# Patient Record
Sex: MALE | Race: WHITE | NOT HISPANIC OR LATINO
[De-identification: names, ages, dates, MRNs, and addresses within clinical notes are randomized per-mention and may not be internally consistent; named-entity substitution may affect disease eponyms.]

---

## 2017-02-23 DIAGNOSIS — R06.00 DYSPNEA, UNSPECIFIED: ICD-10-CM

## 2017-02-23 DIAGNOSIS — R94.31 ABNORMAL ELECTROCARDIOGRAM [ECG] [EKG]: ICD-10-CM

## 2017-02-24 ENCOUNTER — MEDICATION RENEWAL (OUTPATIENT)
Age: 57
End: 2017-02-24

## 2017-03-16 ENCOUNTER — APPOINTMENT (OUTPATIENT)
Dept: FAMILY MEDICINE | Facility: CLINIC | Age: 57
End: 2017-03-16

## 2017-03-16 VITALS
WEIGHT: 190 LBS | SYSTOLIC BLOOD PRESSURE: 136 MMHG | DIASTOLIC BLOOD PRESSURE: 66 MMHG | HEIGHT: 71 IN | BODY MASS INDEX: 26.6 KG/M2

## 2017-03-17 LAB
ALBUMIN SERPL ELPH-MCNC: 4.1 G/DL
ALP BLD-CCNC: 51 U/L
ALT SERPL-CCNC: 28 U/L
ANION GAP SERPL CALC-SCNC: 16 MMOL/L
AST SERPL-CCNC: 29 U/L
BILIRUB SERPL-MCNC: 0.4 MG/DL
BUN SERPL-MCNC: 21 MG/DL
CALCIUM SERPL-MCNC: 10.2 MG/DL
CHLORIDE SERPL-SCNC: 100 MMOL/L
CHOLEST SERPL-MCNC: 184 MG/DL
CHOLEST/HDLC SERPL: 4.1 RATIO
CO2 SERPL-SCNC: 27 MMOL/L
CREAT SERPL-MCNC: 1.22 MG/DL
GLUCOSE SERPL-MCNC: 92 MG/DL
HDLC SERPL-MCNC: 45 MG/DL
LDLC SERPL CALC-MCNC: 93 MG/DL
POTASSIUM SERPL-SCNC: 4.4 MMOL/L
PROT SERPL-MCNC: 7 G/DL
PSA FREE FLD-MCNC: 32.6 %
PSA FREE SERPL-MCNC: 0.42 NG/ML
PSA SERPL-MCNC: 1.29 NG/ML
SODIUM SERPL-SCNC: 143 MMOL/L
TRIGL SERPL-MCNC: 231 MG/DL

## 2017-07-13 RX ORDER — NEBIVOLOL HYDROCHLORIDE 5 MG/1
5 TABLET ORAL DAILY
Refills: 0 | Status: DISCONTINUED | COMMUNITY
End: 2017-07-13

## 2017-09-25 ENCOUNTER — RX RENEWAL (OUTPATIENT)
Age: 57
End: 2017-09-25

## 2017-10-13 ENCOUNTER — APPOINTMENT (OUTPATIENT)
Dept: FAMILY MEDICINE | Facility: CLINIC | Age: 57
End: 2017-10-13
Payer: COMMERCIAL

## 2017-10-13 VITALS — SYSTOLIC BLOOD PRESSURE: 130 MMHG | DIASTOLIC BLOOD PRESSURE: 74 MMHG

## 2017-10-13 VITALS
WEIGHT: 189 LBS | SYSTOLIC BLOOD PRESSURE: 148 MMHG | HEIGHT: 71 IN | BODY MASS INDEX: 26.46 KG/M2 | DIASTOLIC BLOOD PRESSURE: 70 MMHG

## 2017-10-13 DIAGNOSIS — Z82.49 FAMILY HISTORY OF ISCHEMIC HEART DISEASE AND OTHER DISEASES OF THE CIRCULATORY SYSTEM: ICD-10-CM

## 2017-10-13 DIAGNOSIS — N52.9 MALE ERECTILE DYSFUNCTION, UNSPECIFIED: ICD-10-CM

## 2017-10-13 PROCEDURE — 99214 OFFICE O/P EST MOD 30 MIN: CPT | Mod: 25

## 2017-10-13 PROCEDURE — 36415 COLL VENOUS BLD VENIPUNCTURE: CPT

## 2017-11-03 LAB
ALBUMIN SERPL ELPH-MCNC: 4.5 G/DL
ALP BLD-CCNC: 48 U/L
ALT SERPL-CCNC: 22 U/L
ANION GAP SERPL CALC-SCNC: 14 MMOL/L
AST SERPL-CCNC: 26 U/L
BILIRUB SERPL-MCNC: 0.4 MG/DL
BUN SERPL-MCNC: 22 MG/DL
CALCIUM SERPL-MCNC: 10.4 MG/DL
CHLORIDE SERPL-SCNC: 103 MMOL/L
CO2 SERPL-SCNC: 25 MMOL/L
CREAT SERPL-MCNC: 1.27 MG/DL
GLUCOSE SERPL-MCNC: 98 MG/DL
HEMOCCULT STL QL IA: NEGATIVE
POTASSIUM SERPL-SCNC: 3.6 MMOL/L
PROT SERPL-MCNC: 7.3 G/DL
SODIUM SERPL-SCNC: 142 MMOL/L

## 2018-01-02 ENCOUNTER — MEDICATION RENEWAL (OUTPATIENT)
Age: 58
End: 2018-01-02

## 2018-03-27 ENCOUNTER — RX RENEWAL (OUTPATIENT)
Age: 58
End: 2018-03-27

## 2018-06-21 ENCOUNTER — APPOINTMENT (OUTPATIENT)
Dept: FAMILY MEDICINE | Facility: CLINIC | Age: 58
End: 2018-06-21
Payer: COMMERCIAL

## 2018-06-21 VITALS
SYSTOLIC BLOOD PRESSURE: 120 MMHG | BODY MASS INDEX: 27.58 KG/M2 | HEART RATE: 76 BPM | WEIGHT: 197 LBS | OXYGEN SATURATION: 97 % | RESPIRATION RATE: 12 BRPM | HEIGHT: 71 IN | DIASTOLIC BLOOD PRESSURE: 60 MMHG

## 2018-06-21 DIAGNOSIS — L82.1 OTHER SEBORRHEIC KERATOSIS: ICD-10-CM

## 2018-06-21 DIAGNOSIS — D18.01 HEMANGIOMA OF SKIN AND SUBCUTANEOUS TISSUE: ICD-10-CM

## 2018-06-21 DIAGNOSIS — G62.9 POLYNEUROPATHY, UNSPECIFIED: ICD-10-CM

## 2018-06-21 PROCEDURE — 99214 OFFICE O/P EST MOD 30 MIN: CPT | Mod: 25

## 2018-06-21 PROCEDURE — 36415 COLL VENOUS BLD VENIPUNCTURE: CPT

## 2018-06-21 RX ORDER — AMLODIPINE BESYLATE 5 MG/1
5 TABLET ORAL
Refills: 0 | Status: DISCONTINUED | COMMUNITY
Start: 2017-07-13 | End: 2018-06-21

## 2018-06-21 RX ORDER — HYDROCHLOROTHIAZIDE 25 MG/1
25 TABLET ORAL DAILY
Refills: 0 | Status: DISCONTINUED | COMMUNITY
End: 2018-06-21

## 2018-06-21 NOTE — REVIEW OF SYSTEMS
[Earache] : no earache [Chest Pain] : no chest pain [Shortness Of Breath] : no shortness of breath [Abdominal Pain] : no abdominal pain [Dysuria] : no dysuria [Hematuria] : no hematuria [FreeTextEntry9] : see hpi [de-identified] : pt notes brown papile l side, andcoup[le of red spots on abd

## 2018-06-21 NOTE — HEALTH RISK ASSESSMENT
[No falls in past year] : Patient reported no falls in the past year [0] : 2) Feeling down, depressed, or hopeless: Not at all (0) [] : No [[id number]] : 0

## 2018-06-21 NOTE — HISTORY OF PRESENT ILLNESS
[FreeTextEntry1] : Pt is here for follow up BP check. Pt would like to discuss meds. Pt also c/o bi-lat ankle swelling X approx 2 weeks , pt says is much worse at night . Pt denies any sxs such as SOB, chest pain or dizziness.  [de-identified] : pt notes no cp, no sob, pt notes bl edema, notes  no fever , no headache ,pt needs to lose weight as weight has increased , pt had labs in fall last time here, needs some repeated and needs meds refilled, pt has appt upcoming w cardio

## 2018-06-22 LAB
25(OH)D3 SERPL-MCNC: 38.4 NG/ML
ALBUMIN SERPL ELPH-MCNC: 4.2 G/DL
ALP BLD-CCNC: 49 U/L
ALT SERPL-CCNC: 26 U/L
ANION GAP SERPL CALC-SCNC: 16 MMOL/L
AST SERPL-CCNC: 30 U/L
BASOPHILS # BLD AUTO: 0.02 K/UL
BASOPHILS NFR BLD AUTO: 0.2 %
BILIRUB SERPL-MCNC: 0.4 MG/DL
BUN SERPL-MCNC: 20 MG/DL
CALCIUM SERPL-MCNC: 9.8 MG/DL
CHLORIDE SERPL-SCNC: 102 MMOL/L
CHOLEST SERPL-MCNC: 176 MG/DL
CHOLEST/HDLC SERPL: 3.3 RATIO
CO2 SERPL-SCNC: 25 MMOL/L
CREAT SERPL-MCNC: 1.24 MG/DL
EOSINOPHIL # BLD AUTO: 0.21 K/UL
EOSINOPHIL NFR BLD AUTO: 2.6 %
GLUCOSE SERPL-MCNC: 104 MG/DL
HCT VFR BLD CALC: 39.9 %
HDLC SERPL-MCNC: 53 MG/DL
HGB BLD-MCNC: 13 G/DL
IMM GRANULOCYTES NFR BLD AUTO: 0.2 %
LDLC SERPL CALC-MCNC: 92 MG/DL
LYMPHOCYTES # BLD AUTO: 2.31 K/UL
LYMPHOCYTES NFR BLD AUTO: 28.7 %
MAN DIFF?: NORMAL
MCHC RBC-ENTMCNC: 30.8 PG
MCHC RBC-ENTMCNC: 32.6 GM/DL
MCV RBC AUTO: 94.5 FL
MONOCYTES # BLD AUTO: 0.75 K/UL
MONOCYTES NFR BLD AUTO: 9.3 %
NEUTROPHILS # BLD AUTO: 4.75 K/UL
NEUTROPHILS NFR BLD AUTO: 59 %
PLATELET # BLD AUTO: 265 K/UL
POTASSIUM SERPL-SCNC: 3.8 MMOL/L
PROT SERPL-MCNC: 7.4 G/DL
PSA FREE FLD-MCNC: 33.3
PSA FREE SERPL-MCNC: 0.39 NG/ML
PSA SERPL-MCNC: 1.17 NG/ML
RBC # BLD: 4.22 M/UL
RBC # FLD: 13.9 %
SODIUM SERPL-SCNC: 143 MMOL/L
TRIGL SERPL-MCNC: 153 MG/DL
TSH SERPL-ACNC: 1.61 UIU/ML
VIT B12 SERPL-MCNC: 954 PG/ML
WBC # FLD AUTO: 8.06 K/UL

## 2018-09-25 ENCOUNTER — APPOINTMENT (OUTPATIENT)
Dept: FAMILY MEDICINE | Facility: CLINIC | Age: 58
End: 2018-09-25
Payer: COMMERCIAL

## 2018-09-25 VITALS
HEIGHT: 71 IN | SYSTOLIC BLOOD PRESSURE: 140 MMHG | BODY MASS INDEX: 27.72 KG/M2 | DIASTOLIC BLOOD PRESSURE: 82 MMHG | WEIGHT: 198 LBS

## 2018-09-25 PROCEDURE — 99214 OFFICE O/P EST MOD 30 MIN: CPT

## 2018-09-25 RX ORDER — SILDENAFIL CITRATE 50 MG/1
50 TABLET, FILM COATED ORAL
Qty: 10 | Refills: 1 | Status: DISCONTINUED | COMMUNITY
Start: 2017-10-13 | End: 2018-09-25

## 2018-09-25 NOTE — HISTORY OF PRESENT ILLNESS
[FreeTextEntry1] : Patient presents to follow hypertension.pt saw cardio in july and  he said keep meds the same [de-identified] : pt notes over last couple of weeks has been moving bowels in am more frequnet than usual, pt notes stomach has been off, pt admits to taking a lot of hernbal supplemnets , pt does not eat veges, pt notes no cp, no sob, no headaches, pt wants to knopw whatr meds to take , i explained to pt that diuretic is better for ht than amlodipine so we will stay w current regimen,\par \par ankle edema resolved w dc norvasc

## 2018-09-25 NOTE — REVIEW OF SYSTEMS
[Fever] : no fever [Earache] : no earache [Chest Pain] : no chest pain [Shortness Of Breath] : no shortness of breath [Abdominal Pain] : no abdominal pain [Vomiting] : no vomiting [FreeTextEntry7] : see hpistomach feels off, iif eats needs to move bowels, no blood

## 2018-09-25 NOTE — HEALTH RISK ASSESSMENT
[No falls in past year] : Patient reported no falls in the past year [0] : 2) Feeling down, depressed, or hopeless: Not at all (0) [] : No [de-identified] : Cardiologist Dr Pete [[id number]] : 0

## 2018-12-27 LAB
ALBUMIN SERPL ELPH-MCNC: 4.4 G/DL
ALP BLD-CCNC: 53 U/L
ALT SERPL-CCNC: 42 U/L
ANION GAP SERPL CALC-SCNC: 9 MMOL/L
AST SERPL-CCNC: 32 U/L
BILIRUB SERPL-MCNC: 0.4 MG/DL
BUN SERPL-MCNC: 17 MG/DL
CALCIUM SERPL-MCNC: 10.1 MG/DL
CHLORIDE SERPL-SCNC: 103 MMOL/L
CHOLEST SERPL-MCNC: 243 MG/DL
CHOLEST/HDLC SERPL: 5.7 RATIO
CO2 SERPL-SCNC: 29 MMOL/L
CREAT SERPL-MCNC: 1.25 MG/DL
GLUCOSE SERPL-MCNC: 92 MG/DL
HDLC SERPL-MCNC: 43 MG/DL
LDLC SERPL CALC-MCNC: 151 MG/DL
POTASSIUM SERPL-SCNC: 3.8 MMOL/L
PROT SERPL-MCNC: 7.1 G/DL
SODIUM SERPL-SCNC: 141 MMOL/L
TRIGL SERPL-MCNC: 244 MG/DL
TSH SERPL-ACNC: 2.08 UIU/ML

## 2019-01-04 ENCOUNTER — APPOINTMENT (OUTPATIENT)
Dept: FAMILY MEDICINE | Facility: CLINIC | Age: 59
End: 2019-01-04
Payer: COMMERCIAL

## 2019-01-04 VITALS
BODY MASS INDEX: 27.72 KG/M2 | OXYGEN SATURATION: 96 % | HEIGHT: 71 IN | WEIGHT: 198 LBS | HEART RATE: 84 BPM | DIASTOLIC BLOOD PRESSURE: 70 MMHG | RESPIRATION RATE: 14 BRPM | SYSTOLIC BLOOD PRESSURE: 130 MMHG

## 2019-01-04 DIAGNOSIS — R60.9 EDEMA, UNSPECIFIED: ICD-10-CM

## 2019-01-04 DIAGNOSIS — Z87.19 PERSONAL HISTORY OF OTHER DISEASES OF THE DIGESTIVE SYSTEM: ICD-10-CM

## 2019-01-04 DIAGNOSIS — M25.473 EFFUSION, UNSPECIFIED ANKLE: ICD-10-CM

## 2019-01-04 DIAGNOSIS — N20.0 CALCULUS OF KIDNEY: ICD-10-CM

## 2019-01-04 PROCEDURE — 99214 OFFICE O/P EST MOD 30 MIN: CPT

## 2019-01-04 NOTE — HISTORY OF PRESENT ILLNESS
[FreeTextEntry1] : Pt is here for follow up blood work results.  Pt also c/o cold sxs, including and sinus congestion X approx 1 week .  Pt did state there was some bloody mucous when he blew his nose this morning.  Pt says did not continue to bleed .   [de-identified] : pt notes some bloody mucous jusrt today, no fever, pt son was w sore throat, pt has 2 grand babies \par \par pt has been not as diligent diet ,eating more cheese, eating egg

## 2019-01-04 NOTE — HEALTH RISK ASSESSMENT
[No falls in past year] : Patient reported no falls in the past year [] : No [de-identified] : soc

## 2019-01-30 LAB — NONINV COLON CA DNA+OCC BLD SCRN STL QL: NEGATIVE

## 2019-03-21 ENCOUNTER — APPOINTMENT (OUTPATIENT)
Dept: FAMILY MEDICINE | Facility: CLINIC | Age: 59
End: 2019-03-21
Payer: COMMERCIAL

## 2019-03-21 VITALS
HEIGHT: 71 IN | WEIGHT: 203 LBS | DIASTOLIC BLOOD PRESSURE: 98 MMHG | SYSTOLIC BLOOD PRESSURE: 172 MMHG | BODY MASS INDEX: 28.42 KG/M2 | HEART RATE: 76 BPM

## 2019-03-21 VITALS — SYSTOLIC BLOOD PRESSURE: 152 MMHG | DIASTOLIC BLOOD PRESSURE: 82 MMHG

## 2019-03-21 PROCEDURE — 36415 COLL VENOUS BLD VENIPUNCTURE: CPT

## 2019-03-21 PROCEDURE — 99213 OFFICE O/P EST LOW 20 MIN: CPT | Mod: 25

## 2019-03-21 NOTE — PHYSICAL EXAM
[No Acute Distress] : no acute distress [Normal Oropharynx] : the oropharynx was normal [Clear to Auscultation] : lungs were clear to auscultation bilaterally [Normal S1, S2] : normal S1 and S2 [Soft] : abdomen soft [No Focal Deficits] : no focal deficits [de-identified] : facies symmetriuc

## 2019-03-21 NOTE — HISTORY OF PRESENT ILLNESS
[FreeTextEntry1] : Patient at office for bloodwork, BP elevated at 172/98. Patient verbalizes compliance with medications. [de-identified] : pt was here for labs and bpfound to be high, no ha, no cp, no sob, pt notes had eating eggs,  pt now has changed diet  since nov, pt here for lab and nurse found bp up, pt does not want to talke additional meds, pt is awre he needs to ,lose weight

## 2019-03-29 LAB
ALBUMIN SERPL ELPH-MCNC: 4.7 G/DL
ALP BLD-CCNC: 53 U/L
ALT SERPL-CCNC: 38 U/L
AST SERPL-CCNC: 27 U/L
BILIRUB DIRECT SERPL-MCNC: 0.1 MG/DL
BILIRUB INDIRECT SERPL-MCNC: 0.3 MG/DL
BILIRUB SERPL-MCNC: 0.4 MG/DL
CHOLEST SERPL-MCNC: 202 MG/DL
CHOLEST/HDLC SERPL: 4.5 RATIO
HDLC SERPL-MCNC: 45 MG/DL
LDLC SERPL CALC-MCNC: 132 MG/DL
PROT SERPL-MCNC: 7.2 G/DL
TRIGL SERPL-MCNC: 127 MG/DL

## 2019-04-05 ENCOUNTER — APPOINTMENT (OUTPATIENT)
Dept: FAMILY MEDICINE | Facility: CLINIC | Age: 59
End: 2019-04-05
Payer: COMMERCIAL

## 2019-04-05 VITALS
OXYGEN SATURATION: 99 % | HEART RATE: 80 BPM | BODY MASS INDEX: 27.72 KG/M2 | WEIGHT: 198 LBS | SYSTOLIC BLOOD PRESSURE: 124 MMHG | HEIGHT: 71 IN | RESPIRATION RATE: 12 BRPM | DIASTOLIC BLOOD PRESSURE: 66 MMHG

## 2019-04-05 DIAGNOSIS — N40.0 BENIGN PROSTATIC HYPERPLASIA WITHOUT LOWER URINARY TRACT SYMPMS: ICD-10-CM

## 2019-04-05 PROCEDURE — 99214 OFFICE O/P EST MOD 30 MIN: CPT

## 2019-04-05 NOTE — HEALTH RISK ASSESSMENT
[No falls in past year] : Patient reported no falls in the past year [] : No [de-identified] : soc [de-identified] : active

## 2019-04-05 NOTE — HISTORY OF PRESENT ILLNESS
[FreeTextEntry1] : Pt is here for follow up chol.  Pt had blood work . [de-identified] : pt needs to watch the chol, pt notes has changed diet over last month, pt aware

## 2019-04-05 NOTE — REVIEW OF SYSTEMS
[Fever] : no fever [Chest Pain] : no chest pain [Shortness Of Breath] : no shortness of breath [Abdominal Pain] : no abdominal pain [Headache] : no headache

## 2019-06-15 ENCOUNTER — TRANSCRIPTION ENCOUNTER (OUTPATIENT)
Age: 59
End: 2019-06-15

## 2019-10-15 ENCOUNTER — RX RENEWAL (OUTPATIENT)
Age: 59
End: 2019-10-15

## 2019-10-15 LAB
25(OH)D3 SERPL-MCNC: 72.1 NG/ML
BASOPHILS # BLD AUTO: 0.02 K/UL
BASOPHILS NFR BLD AUTO: 0.2 %
EOSINOPHIL # BLD AUTO: 0.16 K/UL
EOSINOPHIL NFR BLD AUTO: 1.9 %
ESTIMATED AVERAGE GLUCOSE: 120 MG/DL
HBA1C MFR BLD HPLC: 5.8 %
HCT VFR BLD CALC: 41.4 %
HGB BLD-MCNC: 13.3 G/DL
IMM GRANULOCYTES NFR BLD AUTO: 0.3 %
LYMPHOCYTES # BLD AUTO: 2.34 K/UL
LYMPHOCYTES NFR BLD AUTO: 27.1 %
MAN DIFF?: NORMAL
MCHC RBC-ENTMCNC: 30.7 PG
MCHC RBC-ENTMCNC: 32.1 GM/DL
MCV RBC AUTO: 95.6 FL
MONOCYTES # BLD AUTO: 0.95 K/UL
MONOCYTES NFR BLD AUTO: 11 %
NEUTROPHILS # BLD AUTO: 5.13 K/UL
NEUTROPHILS NFR BLD AUTO: 59.5 %
PLATELET # BLD AUTO: 272 K/UL
RBC # BLD: 4.33 M/UL
RBC # FLD: 12.6 %
WBC # FLD AUTO: 8.63 K/UL

## 2019-10-16 ENCOUNTER — APPOINTMENT (OUTPATIENT)
Dept: FAMILY MEDICINE | Facility: CLINIC | Age: 59
End: 2019-10-16
Payer: COMMERCIAL

## 2019-10-16 VITALS
DIASTOLIC BLOOD PRESSURE: 80 MMHG | OXYGEN SATURATION: 96 % | WEIGHT: 201 LBS | HEIGHT: 71 IN | BODY MASS INDEX: 28.14 KG/M2 | SYSTOLIC BLOOD PRESSURE: 160 MMHG | HEART RATE: 76 BPM

## 2019-10-16 VITALS — DIASTOLIC BLOOD PRESSURE: 80 MMHG | SYSTOLIC BLOOD PRESSURE: 140 MMHG

## 2019-10-16 PROCEDURE — 99214 OFFICE O/P EST MOD 30 MIN: CPT

## 2019-10-18 LAB
ALBUMIN SERPL ELPH-MCNC: 4.3 G/DL
ALP BLD-CCNC: 52 U/L
ALT SERPL-CCNC: 30 U/L
ANION GAP SERPL CALC-SCNC: 16 MMOL/L
AST SERPL-CCNC: 32 U/L
BILIRUB SERPL-MCNC: 0.4 MG/DL
BUN SERPL-MCNC: 21 MG/DL
CALCIUM SERPL-MCNC: 10.1 MG/DL
CHLORIDE SERPL-SCNC: 103 MMOL/L
CHOLEST SERPL-MCNC: 189 MG/DL
CHOLEST/HDLC SERPL: 3.9 RATIO
CO2 SERPL-SCNC: 24 MMOL/L
CREAT SERPL-MCNC: 1.28 MG/DL
GLUCOSE SERPL-MCNC: 101 MG/DL
HDLC SERPL-MCNC: 48 MG/DL
LDLC SERPL CALC-MCNC: 122 MG/DL
POTASSIUM SERPL-SCNC: 4.5 MMOL/L
PROT SERPL-MCNC: 6.6 G/DL
PSA FREE FLD-MCNC: 37 %
PSA FREE SERPL-MCNC: 0.52 NG/ML
PSA SERPL-MCNC: 1.4 NG/ML
SODIUM SERPL-SCNC: 143 MMOL/L
TRIGL SERPL-MCNC: 94 MG/DL

## 2019-12-28 ENCOUNTER — EMERGENCY (EMERGENCY)
Facility: HOSPITAL | Age: 59
LOS: 0 days | Discharge: HOME | End: 2019-12-28
Attending: EMERGENCY MEDICINE
Payer: COMMERCIAL

## 2019-12-28 VITALS
TEMPERATURE: 98 F | DIASTOLIC BLOOD PRESSURE: 91 MMHG | SYSTOLIC BLOOD PRESSURE: 148 MMHG | OXYGEN SATURATION: 97 % | HEART RATE: 88 BPM | RESPIRATION RATE: 16 BRPM

## 2019-12-28 VITALS — HEIGHT: 71 IN | WEIGHT: 190.04 LBS
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
## 2019-12-28 DIAGNOSIS — M25.522 PAIN IN LEFT ELBOW: ICD-10-CM

## 2019-12-28 DIAGNOSIS — M79.602 PAIN IN LEFT ARM: ICD-10-CM

## 2019-12-28 DIAGNOSIS — I10 ESSENTIAL (PRIMARY) HYPERTENSION: ICD-10-CM
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
## 2019-12-28 PROCEDURE — 73130 X-RAY EXAM OF HAND: CPT | Mod: LT

## 2019-12-28 PROCEDURE — 73060 X-RAY EXAM OF HUMERUS: CPT | Mod: 26,LT

## 2019-12-28 PROCEDURE — 99284 EMERGENCY DEPT VISIT MOD MDM: CPT | Mod: 25

## 2019-12-28 PROCEDURE — 73030 X-RAY EXAM OF SHOULDER: CPT | Mod: 26,LT

## 2019-12-28 PROCEDURE — 73130 X-RAY EXAM OF HAND: CPT | Mod: 26,LT

## 2019-12-28 PROCEDURE — 73060 X-RAY EXAM OF HUMERUS: CPT | Mod: LT

## 2019-12-28 PROCEDURE — 99282 EMERGENCY DEPT VISIT SF MDM: CPT

## 2019-12-28 PROCEDURE — 73030 X-RAY EXAM OF SHOULDER: CPT | Mod: LT

## 2019-12-28 RX ORDER — IBUPROFEN 200 MG
600 TABLET ORAL ONCE
Refills: 0 | Status: COMPLETED | OUTPATIENT
Start: 2019-12-28 | End: 2019-12-28

## 2019-12-28 RX ORDER — ACETAMINOPHEN 500 MG
975 TABLET ORAL ONCE
Refills: 0 | Status: COMPLETED | OUTPATIENT
Start: 2019-12-28 | End: 2019-12-28

## 2019-12-28 RX ADMIN — Medication 975 MILLIGRAM(S): at 16:00

## 2019-12-28 RX ADMIN — Medication 600 MILLIGRAM(S): at 16:00

## 2019-12-28 NOTE — ED STATDOCS - NSFOLLOWUPINSTRUCTIONS_ED_ALL_ED_FT
Distal Biceps Tendon Disruption  The distal biceps tendon is a strong cord of tissue that connects the biceps muscle—which is the muscle on the front of the upper arm—to a bone (radius) in the elbow. Distal biceps tendon disruption is an injury that may include either of the following:  A complete tear (rupture) in the tendon, causing the tendon to completely separate from the radius. When this happens, the biceps muscle pulls up (retracts) toward the shoulder.A partial tear in the tendon that causes the tendon to partially separate from the radius.This condition can interfere with your ability to turn your hand palm-up (supination) and bend (flex) your elbow. It is often treated with surgery, and recovery may take 4–8 months.  What are the causes?  This condition is usually caused by suddenly straightening the elbow while the biceps muscle is tightened (contracted). This could happen, for example, while lifting or carrying a heavy object that suddenly falls or shifts position.  What increases the risk?  The following factors may make you more likely to develop this condition:  Being a man who is 30–50 years old.Smoking.Using steroids.What are the signs or symptoms?  Symptoms of this condition may include:  Feeling a "pop" in the elbow at the time of injury.Pain, swelling, and bruising in the front of the elbow.Weakness in the arm when doing certain movements, such as:  Lifting or carrying objects.Rotating the forearm, such as when you turn a screwdriver.Bending the elbow.A bulge in the upper arm. You may feel this in the front of the arm, the inside of the arm, or both.Limited range of motion of the elbow and forearm.How is this diagnosed?  This condition may be diagnosed based on:  Your symptoms and medical history.A physical exam. Your health care provider may test your range of motion by having you do arm movements.Tests, such as:  X-rays.MRI.Ultrasound.How is this treated?  Treatment for this condition may include:  Medicines to help relieve pain and inflammation.A splint or brace to immobilize your elbow.Wearing a sling to help rest your arm.Resting from sports and intense physical activity.Surgery to reattach your tendon to your radius. This is the most common treatment.Physical therapy.Follow these instructions at home:  If you have a splint, brace, or sling:     Wear the splint, brace, or sling as told by your health care provider. Remove it only as told by your health care provider.Loosen the splint, brace, or sling if your fingers tingle, become numb, or turn cold and blue.Keep the splint, brace, or sling clean and dry.Bathing     Do not take baths, swim, or use a hot tub until your health care provider approves. Ask your health care provider if you may take showers. You may only be allowed to take sponge baths.If you have a splint, brace, or sling that is not waterproof:  Do not let it get wet.Cover it with a watertight covering when you take a bath or shower.Managing pain, stiffness, and swelling               If directed, put ice on the injured area:  Put ice in a plastic bag.Place a towel between your skin and the bag.Leave the ice on for 20 minutes, 2–3 times a day.If directed, apply heat to the affected area before you exercise or as often as told by your health care provider. Use the heat source that your health care provider recommends, such as a moist heat pack or a heating pad.  Place a towel between your skin and the heat source.Leave the heat on for 20–30 minutes.Remove the heat if your skin turns bright red. This is especially important if you are unable to feel pain, heat, or cold. You may have a greater risk of getting burned.Move your fingers often to avoid stiffness and to lessen swelling.Raise (elevate) the injured area above the level of your heart while you are sitting or lying down.Activity     Return to your normal activities as told by your health care provider. Ask your health care provider what activities are safe for you.Until your health care provider approves:  Do not lift or carry anything with your injured arm.Do not use the affected limb to support your body weight.Do not participate in contact sports.Avoid activities that cause pain or make your condition worse.Do exercises as told by your physical therapist or health care provider.General instructions     Take over-the-counter and prescription medicines only as told by your health care provider.If you have a splint, do not put pressure on any part of the splint until it is fully hardened. This may take several hours.Ask your health care provider when it is safe to drive if you have a splint, brace, or sling on your arm.Do not use any products that contain nicotine or tobacco, such as cigarettes and e-cigarettes. If you need help quitting, ask your health care provider.Keep all follow-up visits as told by your health care provider. This is important.Contact a health care provider if:  Your splint or brace causes pain or numbness.Get help right away if:  You develop severe pain.You develop numbness or tingling in your hand.Your hand feels unusually cold.Your fingernails turn a dark color, such as blue or gray.Summary  Distal biceps tendon disruption is an injury that may involve a complete or partial tear of the tendon.This condition is usually caused by suddenly straightening the elbow while the biceps muscle is tightened.Treatment may include medicines, rest, physical therapy, immobilization, or surgery.This information is not intended to replace advice given to you by your health care provider. Make sure you discuss any questions you have with your health care provider.    Document Released: 12/18/2006 Document Revised: 05/13/2019 Document Reviewed: 05/13/2019  [employer] Patient Education © 2019 [employer]
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
## 2019-12-28 NOTE — ED STATDOCS - ATTENDING CONTRIBUTION TO CARE
I, Pito Rothman MD,  performed the initial face to face bedside interview with this patient regarding history of present illness, review of symptoms and relevant past medical, social and family history.  I completed an independent physical examination.  I was the initial provider who evaluated this patient. I have signed out the follow up of any pending tests (i.e. labs, radiological studies) to the ACP.  I have communicated the patient’s plan of care and disposition with the ACP.

## 2019-12-28 NOTE — ED STATDOCS - PATIENT PORTAL LINK FT
You can access the FollowMyHealth Patient Portal offered by [facility] by registering at the following website: [email or web address]. By joining [facility]’s FollowMyHealth portal, you will also be able to view your health information using other applications (apps) compatible with our system.

## 2019-12-28 NOTE — ED STATDOCS - CARE PROVIDER_API CALL
Marely Fried)  [facility]  [address] [place] [address]  Phone: [phone]  Fax: [phone]  Follow Up Time:

## 2019-12-28 NOTE — ED STATDOCS - PHYSICAL EXAMINATION
*GEN: NAD; well appearing; A+O x3   *HEAD: NC/AT   *EYES/NOSE: PERRL & EOMI b/l  *THROAT: airway patent, moist mucous membranes  *NECK: Neck supple, no masses  *PULMONARY: CTA b/l, symmetric breath sounds.   *CARDIAC: s1s2, regular rhythm, no Murmur  *ABDOMEN:  ND, NT, soft, no guarding, no rebound, no masses   *BACK: no CVA tenderness, Normal  spine   *EXTREMITIES: symmetric pulses, 2+ dp & radial pulses, capillary refill < 2 seconds, no cyanosis, no edema, +mild soft tissue TTP left arm   *SKIN: no rash or bruising   *NEUROLOGIC: alert, CN 2-12 intact, moves all 4 extremities, full active & passive ROM in all extremities, normal baseline gait  *PSYCH: insight and judgment nl, memory nl, affect nl, thought nl

## 2019-12-28 NOTE — ED STATDOCS - NS ED ROS FT
Review of Systems:  	•	CONSTITUTIONAL: no fever  	•	SKIN: no rash  	•	RESPIRATORY: no shortness of breath  	•	CARDIAC: no chest pain, no palpitations  	•	GI:  no abd pain, no nausea, no vomiting, no diarrhea  	•	GENITO-URINARY:  no dysuria; no hematuria    	•	MUSCULOSKELETAL:  no back pain +left arm pain   	•	NEUROLOGIC: no weakness  	•	ALLERGY: no rhinitis  	•	PSYSCHIATRIC: no anxiety
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
## 2020-03-20 ENCOUNTER — RX RENEWAL (OUTPATIENT)
Age: 60
End: 2020-03-20

## 2020-06-09 ENCOUNTER — RX RENEWAL (OUTPATIENT)
Age: 60
End: 2020-06-09

## 2020-06-10 PROBLEM — I10 ESSENTIAL (PRIMARY) HYPERTENSION: Chronic | Status: ACTIVE | Noted: 2020-01-07

## 2020-06-22 ENCOUNTER — APPOINTMENT (OUTPATIENT)
Dept: FAMILY MEDICINE | Facility: CLINIC | Age: 60
End: 2020-06-22
Payer: COMMERCIAL

## 2020-06-22 VITALS
BODY MASS INDEX: 28.56 KG/M2 | HEIGHT: 71 IN | TEMPERATURE: 98.2 F | SYSTOLIC BLOOD PRESSURE: 138 MMHG | HEART RATE: 83 BPM | DIASTOLIC BLOOD PRESSURE: 72 MMHG | WEIGHT: 204 LBS | OXYGEN SATURATION: 96 %

## 2020-06-22 DIAGNOSIS — I11.9 HYPERTENSIVE HEART DISEASE W/OUT HEART FAILURE: ICD-10-CM

## 2020-06-22 DIAGNOSIS — E87.6 HYPOKALEMIA: ICD-10-CM

## 2020-06-22 PROCEDURE — 36415 COLL VENOUS BLD VENIPUNCTURE: CPT

## 2020-06-22 PROCEDURE — 99214 OFFICE O/P EST MOD 30 MIN: CPT | Mod: 25
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
## 2020-06-22 NOTE — PHYSICAL EXAM
[Normal TMs] : both tympanic membranes were normal [No Acute Distress] : no acute distress [Supple] : supple [Clear to Auscultation] : lungs were clear to auscultation bilaterally [No Focal Deficits] : no focal deficits [Normal S1, S2] : normal S1 and S2 [Soft] : abdomen soft [Normal Affect] : the affect was normal

## 2020-06-23 LAB
ALBUMIN SERPL ELPH-MCNC: 4.6 G/DL
ALP BLD-CCNC: 62 U/L
ALT SERPL-CCNC: 37 U/L
ANION GAP SERPL CALC-SCNC: 13 MMOL/L
AST SERPL-CCNC: 37 U/L
BILIRUB SERPL-MCNC: 0.4 MG/DL
BUN SERPL-MCNC: 21 MG/DL
CALCIUM SERPL-MCNC: 10.2 MG/DL
CHLORIDE SERPL-SCNC: 103 MMOL/L
CO2 SERPL-SCNC: 26 MMOL/L
CREAT SERPL-MCNC: 1.28 MG/DL
GLUCOSE SERPL-MCNC: 108 MG/DL
POTASSIUM SERPL-SCNC: 4 MMOL/L
PROT SERPL-MCNC: 7 G/DL
SODIUM SERPL-SCNC: 141 MMOL/L

## 2020-06-25 LAB
CHOLEST SERPL-MCNC: 229 MG/DL
CHOLEST/HDLC SERPL: 5.3 RATIO
HDLC SERPL-MCNC: 43 MG/DL
LDLC SERPL CALC-MCNC: 144 MG/DL
TRIGL SERPL-MCNC: 205 MG/DL

## 2021-01-06 DIAGNOSIS — M54.2 CERVICALGIA: ICD-10-CM

## 2021-02-27 ENCOUNTER — APPOINTMENT (OUTPATIENT)
Dept: FAMILY MEDICINE | Facility: CLINIC | Age: 61
End: 2021-02-27
Payer: COMMERCIAL

## 2021-02-27 VITALS
HEIGHT: 71 IN | BODY MASS INDEX: 29.26 KG/M2 | WEIGHT: 209 LBS | SYSTOLIC BLOOD PRESSURE: 150 MMHG | HEART RATE: 85 BPM | OXYGEN SATURATION: 97 % | TEMPERATURE: 97.6 F | DIASTOLIC BLOOD PRESSURE: 88 MMHG

## 2021-02-27 DIAGNOSIS — R51.9 HEADACHE, UNSPECIFIED: ICD-10-CM

## 2021-02-27 DIAGNOSIS — Z86.79 PERSONAL HISTORY OF OTHER DISEASES OF THE CIRCULATORY SYSTEM: ICD-10-CM

## 2021-02-27 DIAGNOSIS — Z00.00 ENCOUNTER FOR GENERAL ADULT MEDICAL EXAMINATION W/OUT ABNORMAL FINDINGS: ICD-10-CM

## 2021-02-27 PROCEDURE — 99072 ADDL SUPL MATRL&STAF TM PHE: CPT

## 2021-02-27 PROCEDURE — 36415 COLL VENOUS BLD VENIPUNCTURE: CPT

## 2021-02-27 PROCEDURE — 99214 OFFICE O/P EST MOD 30 MIN: CPT | Mod: 25

## 2021-02-27 NOTE — PHYSICAL EXAM
[de-identified] : mild bl facial sts r greater than l in scalp area, nontender, no temportla artery tender

## 2021-02-27 NOTE — HISTORY OF PRESENT ILLNESS
[FreeTextEntry8] : Pt c/o right dull pain on side of head x 1 week, pt note a scalp sensitivity for 2 mos , pt notes no rash, pt notes no weakness or numbness or tingling, pt notes no cp, no sob, pt desribes as headache,pt notes comes out as day progresses , and pt has been coimpliant w his bp meds which he talkes in am, pt notes no tenderness over artery , pt notes he is claustrophobic, pt has tolerated open mris

## 2021-02-27 NOTE — REVIEW OF SYSTEMS
unchanged [Earache] : no earache [Chest Pain] : no chest pain [Shortness Of Breath] : no shortness of breath [de-identified] : pain in r temporal area, no trauma or rash

## 2021-03-01 LAB
25(OH)D3 SERPL-MCNC: 68.1 NG/ML
ALBUMIN SERPL ELPH-MCNC: 4.2 G/DL
ALP BLD-CCNC: 55 U/L
ALT SERPL-CCNC: 31 U/L
ANION GAP SERPL CALC-SCNC: 14 MMOL/L
AST SERPL-CCNC: 29 U/L
BASOPHILS # BLD AUTO: 0.03 K/UL
BASOPHILS NFR BLD AUTO: 0.4 %
BILIRUB SERPL-MCNC: 0.4 MG/DL
BUN SERPL-MCNC: 21 MG/DL
CALCIUM SERPL-MCNC: 10.5 MG/DL
CHLORIDE SERPL-SCNC: 104 MMOL/L
CHOLEST SERPL-MCNC: 235 MG/DL
CO2 SERPL-SCNC: 23 MMOL/L
CREAT SERPL-MCNC: 1.3 MG/DL
EOSINOPHIL # BLD AUTO: 0.14 K/UL
EOSINOPHIL NFR BLD AUTO: 1.7 %
ERYTHROCYTE [SEDIMENTATION RATE] IN BLOOD BY WESTERGREN METHOD: 16 MM/HR
GLUCOSE SERPL-MCNC: 99 MG/DL
HCT VFR BLD CALC: 41.5 %
HDLC SERPL-MCNC: 42 MG/DL
HGB BLD-MCNC: 13.1 G/DL
IMM GRANULOCYTES NFR BLD AUTO: 0.4 %
LDLC SERPL CALC-MCNC: 157 MG/DL
LYMPHOCYTES # BLD AUTO: 2.09 K/UL
LYMPHOCYTES NFR BLD AUTO: 25.7 %
MAN DIFF?: NORMAL
MCHC RBC-ENTMCNC: 30.5 PG
MCHC RBC-ENTMCNC: 31.6 GM/DL
MCV RBC AUTO: 96.7 FL
MONOCYTES # BLD AUTO: 0.72 K/UL
MONOCYTES NFR BLD AUTO: 8.9 %
NEUTROPHILS # BLD AUTO: 5.12 K/UL
NEUTROPHILS NFR BLD AUTO: 62.9 %
NONHDLC SERPL-MCNC: 193 MG/DL
PLATELET # BLD AUTO: 281 K/UL
POTASSIUM SERPL-SCNC: 4.5 MMOL/L
PROT SERPL-MCNC: 7.4 G/DL
PSA FREE FLD-MCNC: 35 %
PSA FREE SERPL-MCNC: 0.49 NG/ML
PSA SERPL-MCNC: 1.41 NG/ML
RBC # BLD: 4.29 M/UL
RBC # FLD: 13.2 %
SODIUM SERPL-SCNC: 141 MMOL/L
TRIGL SERPL-MCNC: 181 MG/DL
TSH SERPL-ACNC: 2.57 UIU/ML
VIT B12 SERPL-MCNC: 1992 PG/ML
WBC # FLD AUTO: 8.13 K/UL

## 2021-04-19 ENCOUNTER — APPOINTMENT (OUTPATIENT)
Dept: NEUROLOGY | Facility: CLINIC | Age: 61
End: 2021-04-19
Payer: COMMERCIAL

## 2021-04-19 VITALS
DIASTOLIC BLOOD PRESSURE: 80 MMHG | WEIGHT: 190 LBS | BODY MASS INDEX: 26.02 KG/M2 | SYSTOLIC BLOOD PRESSURE: 140 MMHG | TEMPERATURE: 97.6 F | HEIGHT: 71.5 IN

## 2021-04-19 DIAGNOSIS — R51.9 HEADACHE, UNSPECIFIED: ICD-10-CM

## 2021-04-19 PROCEDURE — 99204 OFFICE O/P NEW MOD 45 MIN: CPT

## 2021-04-19 PROCEDURE — 99072 ADDL SUPL MATRL&STAF TM PHE: CPT

## 2021-04-19 RX ORDER — DIAZEPAM 5 MG/1
5 TABLET ORAL
Qty: 4 | Refills: 0 | Status: COMPLETED | COMMUNITY
Start: 2021-02-27 | End: 2021-04-19

## 2021-10-22 ENCOUNTER — APPOINTMENT (OUTPATIENT)
Dept: FAMILY MEDICINE | Facility: CLINIC | Age: 61
End: 2021-10-22
Payer: COMMERCIAL

## 2021-10-22 VITALS — WEIGHT: 199 LBS | BODY MASS INDEX: 38.86 KG/M2

## 2021-10-22 VITALS
HEIGHT: 60 IN | SYSTOLIC BLOOD PRESSURE: 150 MMHG | HEART RATE: 88 BPM | RESPIRATION RATE: 16 BRPM | DIASTOLIC BLOOD PRESSURE: 80 MMHG | WEIGHT: 200 LBS | OXYGEN SATURATION: 98 % | BODY MASS INDEX: 39.27 KG/M2

## 2021-10-22 VITALS — DIASTOLIC BLOOD PRESSURE: 70 MMHG | SYSTOLIC BLOOD PRESSURE: 130 MMHG

## 2021-10-22 DIAGNOSIS — Z20.822 CONTACT WITH AND (SUSPECTED) EXPOSURE TO COVID-19: ICD-10-CM

## 2021-10-22 PROCEDURE — 36415 COLL VENOUS BLD VENIPUNCTURE: CPT

## 2021-10-22 PROCEDURE — 99214 OFFICE O/P EST MOD 30 MIN: CPT | Mod: 25
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
## 2021-10-22 NOTE — PHYSICAL EXAM
[No Acute Distress] : no acute distress [Normal S1, S2] : normal S1 and S2 [No Varicosities] : no varicosities [Soft] : abdomen soft [Normal Anterior Cervical Nodes] : no anterior cervical lymphadenopathy [No CVA Tenderness] : no CVA  tenderness

## 2021-10-28 LAB
ALBUMIN SERPL ELPH-MCNC: 4.3 G/DL
ALP BLD-CCNC: 57 U/L
ALT SERPL-CCNC: 30 U/L
ANION GAP SERPL CALC-SCNC: 14 MMOL/L
AST SERPL-CCNC: 29 U/L
BILIRUB SERPL-MCNC: 0.4 MG/DL
BUN SERPL-MCNC: 25 MG/DL
CALCIUM SERPL-MCNC: 10.3 MG/DL
CHLORIDE SERPL-SCNC: 101 MMOL/L
CHOLEST SERPL-MCNC: 247 MG/DL
CO2 SERPL-SCNC: 26 MMOL/L
COVID-19 NUCLEOCAPSID  GAM ANTIBODY INTERPRETATION: POSITIVE
CREAT SERPL-MCNC: 1.37 MG/DL
GLUCOSE SERPL-MCNC: 105 MG/DL
HDLC SERPL-MCNC: 48 MG/DL
LDLC SERPL CALC-MCNC: 165 MG/DL
NONHDLC SERPL-MCNC: 199 MG/DL
POTASSIUM SERPL-SCNC: 3.9 MMOL/L
PROT SERPL-MCNC: 6.9 G/DL
SARS-COV-2 AB SERPL QL IA: 148 INDEX
SODIUM SERPL-SCNC: 141 MMOL/L
TRIGL SERPL-MCNC: 172 MG/DL

## 2022-01-24 ENCOUNTER — RX RENEWAL (OUTPATIENT)
Age: 62
End: 2022-01-24

## 2022-04-20 ENCOUNTER — RX RENEWAL (OUTPATIENT)
Age: 62
End: 2022-04-20

## 2022-04-21 ENCOUNTER — RX RENEWAL (OUTPATIENT)
Age: 62
End: 2022-04-21

## 2022-04-29 ENCOUNTER — APPOINTMENT (OUTPATIENT)
Dept: FAMILY MEDICINE | Facility: CLINIC | Age: 62
End: 2022-04-29
Payer: COMMERCIAL

## 2022-04-29 VITALS
HEIGHT: 60 IN | SYSTOLIC BLOOD PRESSURE: 158 MMHG | HEART RATE: 93 BPM | OXYGEN SATURATION: 97 % | BODY MASS INDEX: 39.85 KG/M2 | TEMPERATURE: 97.9 F | DIASTOLIC BLOOD PRESSURE: 96 MMHG | WEIGHT: 203 LBS

## 2022-04-29 VITALS — SYSTOLIC BLOOD PRESSURE: 140 MMHG | DIASTOLIC BLOOD PRESSURE: 90 MMHG

## 2022-04-29 DIAGNOSIS — I10 ESSENTIAL (PRIMARY) HYPERTENSION: ICD-10-CM

## 2022-04-29 DIAGNOSIS — N28.9 DISORDER OF KIDNEY AND URETER, UNSPECIFIED: ICD-10-CM

## 2022-04-29 DIAGNOSIS — R51.9 HEADACHE, UNSPECIFIED: ICD-10-CM

## 2022-04-29 DIAGNOSIS — U07.1 COVID-19: ICD-10-CM

## 2022-04-29 PROCEDURE — 99214 OFFICE O/P EST MOD 30 MIN: CPT | Mod: 25

## 2022-04-29 PROCEDURE — 36415 COLL VENOUS BLD VENIPUNCTURE: CPT

## 2022-04-29 NOTE — PHYSICAL EXAM
[No Acute Distress] : no acute distress [Supple] : supple [Clear to Auscultation] : lungs were clear to auscultation bilaterally [Normal S1, S2] : normal S1 and S2 [Soft] : abdomen soft [Normal Anterior Cervical Nodes] : no anterior cervical lymphadenopathy [No CVA Tenderness] : no CVA  tenderness

## 2022-04-29 NOTE — HISTORY OF PRESENT ILLNESS
[FreeTextEntry1] : Patient is following up on HTN.  pt has high chol and has refused meds in past, pt not sure why he needed to come in today but located patient on the fact that given he has high blood pressure family history of heart disease and elevated cholesterol he is at risk for cardiovascular disease. [de-identified] : pt has been   down as his mom recently passed

## 2022-04-30 LAB
ALBUMIN SERPL ELPH-MCNC: 4.6 G/DL
ALP BLD-CCNC: 67 U/L
ALT SERPL-CCNC: 28 U/L
ANION GAP SERPL CALC-SCNC: 13 MMOL/L
AST SERPL-CCNC: 29 U/L
BILIRUB SERPL-MCNC: 0.4 MG/DL
BUN SERPL-MCNC: 18 MG/DL
CALCIUM SERPL-MCNC: 9.9 MG/DL
CHLORIDE SERPL-SCNC: 105 MMOL/L
CHOLEST SERPL-MCNC: 243 MG/DL
CO2 SERPL-SCNC: 27 MMOL/L
CREAT SERPL-MCNC: 1.25 MG/DL
EGFR: 64 ML/MIN/1.73M2
GLUCOSE SERPL-MCNC: 92 MG/DL
HDLC SERPL-MCNC: 47 MG/DL
LDLC SERPL CALC-MCNC: 167 MG/DL
NONHDLC SERPL-MCNC: 196 MG/DL
POTASSIUM SERPL-SCNC: 4.5 MMOL/L
PROT SERPL-MCNC: 6.9 G/DL
SODIUM SERPL-SCNC: 144 MMOL/L
TRIGL SERPL-MCNC: 145 MG/DL

## 2022-11-01 ENCOUNTER — RX RENEWAL (OUTPATIENT)
Age: 62
End: 2022-11-01

## 2022-11-29 ENCOUNTER — APPOINTMENT (OUTPATIENT)
Dept: FAMILY MEDICINE | Facility: CLINIC | Age: 62
End: 2022-11-29

## 2022-11-29 VITALS — BODY MASS INDEX: 38.08 KG/M2 | WEIGHT: 195 LBS

## 2022-11-29 VITALS — SYSTOLIC BLOOD PRESSURE: 164 MMHG | DIASTOLIC BLOOD PRESSURE: 82 MMHG

## 2022-11-29 DIAGNOSIS — R21 RASH AND OTHER NONSPECIFIC SKIN ERUPTION: ICD-10-CM

## 2022-11-29 DIAGNOSIS — I10 ESSENTIAL (PRIMARY) HYPERTENSION: ICD-10-CM

## 2022-11-29 PROCEDURE — 99214 OFFICE O/P EST MOD 30 MIN: CPT | Mod: 25

## 2022-11-29 PROCEDURE — 36415 COLL VENOUS BLD VENIPUNCTURE: CPT

## 2022-11-29 RX ORDER — MOMETASONE FUROATE 1 MG/G
0.1 CREAM TOPICAL TWICE DAILY
Qty: 1 | Refills: 1 | Status: ACTIVE | COMMUNITY
Start: 2022-11-29 | End: 1900-01-01

## 2022-11-29 NOTE — PHYSICAL EXAM
[No Acute Distress] : no acute distress [Supple] : supple [Clear to Auscultation] : lungs were clear to auscultation bilaterally [Normal S1, S2] : normal S1 and S2 [de-identified] : l mid back patch of skin that is dark sl raised, perhaps eczema

## 2022-11-29 NOTE — HEALTH RISK ASSESSMENT
[0] : 2) Feeling down, depressed, or hopeless: Not at all (0) [PHQ-2 Negative - No further assessment needed] : PHQ-2 Negative - No further assessment needed [I have developed a follow-up plan documented below in the note.] : I have developed a follow-up plan documented below in the note. [[id number]] : 0

## 2022-11-29 NOTE — HISTORY OF PRESENT ILLNESS
[FreeTextEntry1] : pt here for fu bp, pt notes BP at times is high at home and when he settles down and sits for couple minutes is fine.  Blood pressure is usually 1 30-1 40 systolic at home.  Patient admits that when he goes to doctors office blood pressure does go up.  Patient has been compliant with blood pressure medications patient notes no chest pain no shortness of breath no abdominal pain and has been feeling otherwise well.  Patient does complain of a rash on the back that has been itchy and there for at least 6 months, he does scratch it.  Patient does go to a dermatologist but has not been there for a while and will do so [de-identified] : pt here for fu bp , pt has home machine w him and numbers checked below\par pt here for med renewal

## 2022-12-08 LAB
ALBUMIN SERPL ELPH-MCNC: 4.7 G/DL
ALP BLD-CCNC: 52 U/L
ALT SERPL-CCNC: 24 U/L
ANION GAP SERPL CALC-SCNC: 12 MMOL/L
AST SERPL-CCNC: 26 U/L
BILIRUB SERPL-MCNC: 0.4 MG/DL
BUN SERPL-MCNC: 24 MG/DL
CALCIUM SERPL-MCNC: 10.1 MG/DL
CHLORIDE SERPL-SCNC: 103 MMOL/L
CHOLEST SERPL-MCNC: 237 MG/DL
CO2 SERPL-SCNC: 28 MMOL/L
CREAT SERPL-MCNC: 1.4 MG/DL
EGFR: 56 ML/MIN/1.73M2
ESTIMATED AVERAGE GLUCOSE: 120 MG/DL
GLUCOSE SERPL-MCNC: 96 MG/DL
HBA1C MFR BLD HPLC: 5.8 %
HDLC SERPL-MCNC: 47 MG/DL
LDLC SERPL CALC-MCNC: 168 MG/DL
NONHDLC SERPL-MCNC: 191 MG/DL
POTASSIUM SERPL-SCNC: 4.5 MMOL/L
PROT SERPL-MCNC: 7.1 G/DL
PSA FREE FLD-MCNC: 39 %
PSA FREE SERPL-MCNC: 0.56 NG/ML
PSA SERPL-MCNC: 1.44 NG/ML
SODIUM SERPL-SCNC: 143 MMOL/L
TRIGL SERPL-MCNC: 113 MG/DL
TSH SERPL-ACNC: 2.26 UIU/ML

## 2023-04-22 ENCOUNTER — APPOINTMENT (OUTPATIENT)
Dept: FAMILY MEDICINE | Facility: CLINIC | Age: 63
End: 2023-04-22
Payer: MEDICARE

## 2023-04-22 VITALS
OXYGEN SATURATION: 98 % | HEIGHT: 71 IN | BODY MASS INDEX: 28 KG/M2 | DIASTOLIC BLOOD PRESSURE: 82 MMHG | HEART RATE: 83 BPM | TEMPERATURE: 97.8 F | WEIGHT: 200 LBS | SYSTOLIC BLOOD PRESSURE: 160 MMHG

## 2023-04-22 VITALS — SYSTOLIC BLOOD PRESSURE: 140 MMHG | DIASTOLIC BLOOD PRESSURE: 82 MMHG

## 2023-04-22 DIAGNOSIS — R22.31 LOCALIZED SWELLING, MASS AND LUMP, RIGHT UPPER LIMB: ICD-10-CM

## 2023-04-22 DIAGNOSIS — Z82.49 FAMILY HISTORY OF ISCHEMIC HEART DISEASE AND OTHER DISEASES OF THE CIRCULATORY SYSTEM: ICD-10-CM

## 2023-04-22 PROCEDURE — 99214 OFFICE O/P EST MOD 30 MIN: CPT

## 2023-04-22 NOTE — HISTORY OF PRESENT ILLNESS
[FreeTextEntry1] : pt presen silvana fu htn, pt notes had another grandchild, pt bp is up but did not take meed this am \par pt called has fullness  in axilla, pt notes been there for months , not hard , and nontender [de-identified] : pt w an area in r arm pit for few months, nontender, pt notes no dc, pt not able to feel now , pt does not like radiation , pt notes has been good w diet, pt notes cut out the cheese

## 2023-04-22 NOTE — PHYSICAL EXAM
[No Acute Distress] : no acute distress [Normal TMs] : both tympanic membranes were normal [Clear to Auscultation] : lungs were clear to auscultation bilaterally [Supple] : supple [Normal S1, S2] : normal S1 and S2

## 2023-04-24 ENCOUNTER — OUTPATIENT (OUTPATIENT)
Dept: OUTPATIENT SERVICES | Facility: HOSPITAL | Age: 63
LOS: 1 days | End: 2023-04-24
Payer: MEDICARE

## 2023-04-24 ENCOUNTER — APPOINTMENT (OUTPATIENT)
Dept: ULTRASOUND IMAGING | Facility: CLINIC | Age: 63
End: 2023-04-24
Payer: MEDICARE

## 2023-04-24 DIAGNOSIS — R22.31 LOCALIZED SWELLING, MASS AND LUMP, RIGHT UPPER LIMB: ICD-10-CM

## 2023-04-24 PROCEDURE — 76882 US LMTD JT/FCL EVL NVASC XTR: CPT | Mod: 26,RT,76

## 2023-04-24 PROCEDURE — 76882 US LMTD JT/FCL EVL NVASC XTR: CPT

## 2023-04-28 ENCOUNTER — APPOINTMENT (OUTPATIENT)
Dept: FAMILY MEDICINE | Facility: CLINIC | Age: 63
End: 2023-04-28

## 2023-05-11 ENCOUNTER — NON-APPOINTMENT (OUTPATIENT)
Age: 63
End: 2023-05-11

## 2023-10-16 ENCOUNTER — RX RENEWAL (OUTPATIENT)
Age: 63
End: 2023-10-16

## 2023-12-22 NOTE — HISTORY OF PRESENT ILLNESS
"I have personally taken the history and examined this patient and agree with the resident's note as stated above with the following thoughts:  /68 (BP Location: Left arm, Patient Position: Sitting, BP Method: Large (Manual))   Pulse 70   Ht 5' 10" (1.778 m)   Wt 108.6 kg (239 lb 6.7 oz)   SpO2 97%   BMI 34.35 kg/m²       Monitor her calorie intake in diet over the holidays.  If his elbow in the knee pain do not improve with conservative management we will get her over to Orthopedics for evaluation and treatment.  " [FreeTextEntry1] : Pt here for rx refill  [de-identified] : pt declines flu shot , pt notes no cp, no sob, ptr note he drinks a lot of fruit juice and like his bagels, pt notes here to discuss labs and chol, pt notes no vidhi has been compliant w his bp meds, pt has not been watching diet and does eat a lot of carbs,

## 2023-12-29 ENCOUNTER — RX RENEWAL (OUTPATIENT)
Age: 63
End: 2023-12-29

## 2024-02-02 ENCOUNTER — APPOINTMENT (OUTPATIENT)
Dept: FAMILY MEDICINE | Facility: CLINIC | Age: 64
End: 2024-02-02
Payer: MEDICARE

## 2024-02-02 VITALS
OXYGEN SATURATION: 97 % | HEIGHT: 71 IN | SYSTOLIC BLOOD PRESSURE: 158 MMHG | RESPIRATION RATE: 16 BRPM | DIASTOLIC BLOOD PRESSURE: 84 MMHG | HEART RATE: 70 BPM | BODY MASS INDEX: 28.56 KG/M2 | WEIGHT: 204 LBS | TEMPERATURE: 70 F

## 2024-02-02 VITALS — DIASTOLIC BLOOD PRESSURE: 86 MMHG | SYSTOLIC BLOOD PRESSURE: 142 MMHG

## 2024-02-02 DIAGNOSIS — M70.40 PREPATELLAR BURSITIS, UNSPECIFIED KNEE: ICD-10-CM

## 2024-02-02 DIAGNOSIS — R73.9 HYPERGLYCEMIA, UNSPECIFIED: ICD-10-CM

## 2024-02-02 DIAGNOSIS — I10 ESSENTIAL (PRIMARY) HYPERTENSION: ICD-10-CM

## 2024-02-02 DIAGNOSIS — E78.5 HYPERLIPIDEMIA, UNSPECIFIED: ICD-10-CM

## 2024-02-02 PROCEDURE — G2211 COMPLEX E/M VISIT ADD ON: CPT

## 2024-02-02 PROCEDURE — 36415 COLL VENOUS BLD VENIPUNCTURE: CPT

## 2024-02-02 PROCEDURE — 99214 OFFICE O/P EST MOD 30 MIN: CPT

## 2024-02-02 RX ORDER — TRIAMTERENE AND HYDROCHLOROTHIAZIDE 37.5; 25 MG/1; MG/1
37.5-25 CAPSULE ORAL
Qty: 90 | Refills: 1 | Status: ACTIVE | COMMUNITY
Start: 2018-06-21 | End: 1900-01-01

## 2024-02-02 NOTE — PLAN
[FreeTextEntry1] : pt needs to get chol better, pt to do better job w diet pt needs to cut down carbs and sugar in  diet  for knee intact but some fluid below knee cap, cx bursiti- wear knee pads w activityu draw labs   mod risk given need for refills ,medical conditions and their comorbidities, side effects of meds  explained and risk of non compliance discussed do cortisinone to knee caps if worsen

## 2024-02-02 NOTE — HISTORY OF PRESENT ILLNESS
[FreeTextEntry1] : F/U medicaton ,pt has been good, pt notes started to exercise [de-identified] : pot notes lump below knee cap, pt notes no pain , minor swelling below knee

## 2024-02-02 NOTE — PHYSICAL EXAM
[No Acute Distress] : no acute distress [Clear to Auscultation] : lungs were clear to auscultation bilaterally [Normal S1, S2] : normal S1 and S2 [Soft] : abdomen soft [de-identified] : sl scale over knee

## 2024-02-08 LAB
ALBUMIN SERPL ELPH-MCNC: 4.6 G/DL
ALP BLD-CCNC: 53 U/L
ALT SERPL-CCNC: 27 U/L
ANION GAP SERPL CALC-SCNC: 14 MMOL/L
AST SERPL-CCNC: 30 U/L
BASOPHILS # BLD AUTO: 0.02 K/UL
BASOPHILS NFR BLD AUTO: 0.3 %
BILIRUB SERPL-MCNC: 0.5 MG/DL
BUN SERPL-MCNC: 16 MG/DL
CALCIUM SERPL-MCNC: 9.8 MG/DL
CHLORIDE SERPL-SCNC: 103 MMOL/L
CHOLEST SERPL-MCNC: 236 MG/DL
CO2 SERPL-SCNC: 26 MMOL/L
CREAT SERPL-MCNC: 1.39 MG/DL
EGFR: 56 ML/MIN/1.73M2
EOSINOPHIL # BLD AUTO: 0.16 K/UL
EOSINOPHIL NFR BLD AUTO: 2.3 %
ESTIMATED AVERAGE GLUCOSE: 120 MG/DL
GLUCOSE SERPL-MCNC: 86 MG/DL
HBA1C MFR BLD HPLC: 5.8 %
HCT VFR BLD CALC: 39.6 %
HDLC SERPL-MCNC: 44 MG/DL
HGB BLD-MCNC: 13.6 G/DL
IMM GRANULOCYTES NFR BLD AUTO: 0.1 %
LDLC SERPL CALC-MCNC: 170 MG/DL
LYMPHOCYTES # BLD AUTO: 1.81 K/UL
LYMPHOCYTES NFR BLD AUTO: 25.9 %
MAN DIFF?: NORMAL
MCHC RBC-ENTMCNC: 31.1 PG
MCHC RBC-ENTMCNC: 34.3 GM/DL
MCV RBC AUTO: 90.4 FL
MONOCYTES # BLD AUTO: 0.73 K/UL
MONOCYTES NFR BLD AUTO: 10.5 %
NEUTROPHILS # BLD AUTO: 4.25 K/UL
NEUTROPHILS NFR BLD AUTO: 60.9 %
NONHDLC SERPL-MCNC: 192 MG/DL
PLATELET # BLD AUTO: 242 K/UL
POTASSIUM SERPL-SCNC: 3.9 MMOL/L
PROT SERPL-MCNC: 6.9 G/DL
PSA FREE FLD-MCNC: 36 %
PSA FREE SERPL-MCNC: 0.6 NG/ML
PSA SERPL-MCNC: 1.66 NG/ML
RBC # BLD: 4.38 M/UL
RBC # FLD: 13 %
SODIUM SERPL-SCNC: 143 MMOL/L
TRIGL SERPL-MCNC: 117 MG/DL
TSH SERPL-ACNC: 1.9 UIU/ML
WBC # FLD AUTO: 6.98 K/UL

## 2024-02-09 ENCOUNTER — NON-APPOINTMENT (OUTPATIENT)
Age: 64
End: 2024-02-09

## 2024-09-17 ENCOUNTER — RX RENEWAL (OUTPATIENT)
Age: 64
End: 2024-09-17

## 2025-01-30 ENCOUNTER — NON-APPOINTMENT (OUTPATIENT)
Age: 65
End: 2025-01-30